# Patient Record
Sex: MALE | Race: AMERICAN INDIAN OR ALASKA NATIVE | NOT HISPANIC OR LATINO | Employment: OTHER | URBAN - METROPOLITAN AREA
[De-identification: names, ages, dates, MRNs, and addresses within clinical notes are randomized per-mention and may not be internally consistent; named-entity substitution may affect disease eponyms.]

---

## 2019-09-18 ENCOUNTER — TELEPHONE (OUTPATIENT)
Dept: GASTROENTEROLOGY | Facility: CLINIC | Age: 74
End: 2019-09-18

## 2019-09-18 NOTE — TELEPHONE ENCOUNTER
Per dr Vianey suggs pt for capsule-pt inpt at [facility] egd done 9/16/19  (report for egd and hpylori were put on dr swartz's desk 9/18/19-will not be scanned in until dr swartz signs off on the hpylori)

## 2019-09-20 ENCOUNTER — TELEPHONE (OUTPATIENT)
Dept: GASTROENTEROLOGY | Facility: CLINIC | Age: 74
End: 2019-09-20

## 2019-09-20 NOTE — TELEPHONE ENCOUNTER
Mai Dumont, nurse from Dr Conley/SHANNON @ [facility] has ques re: resuming meds; states Pt was in pt w/ anemia and questionable GI bld; consult on 9/17 indicates Rony Brower resuming Eliquis but Pt also on 81 mg aspirin and not mentioned; states cardiology ref'd to GI; req  [phone]

## 2019-09-25 ENCOUNTER — TELEPHONE (OUTPATIENT)
Dept: GASTROENTEROLOGY | Facility: CLINIC | Age: 74
End: 2019-09-25

## 2019-09-25 DIAGNOSIS — B96.81 HELICOBACTER PYLORI (H. PYLORI) AS THE CAUSE OF DISEASES CLASSIFIED ELSEWHERE: Primary | ICD-10-CM

## 2019-09-25 NOTE — TELEPHONE ENCOUNTER
MATTHEW--Order for Pylera was to be sent to his pharmacy but was set to print so did not go through  Called and spoke with pt's son Bertha Blanco who noted pt has been on antibiotics for H pylori since D/C 9/18  He is on Pantoprazole, Doxycycline, Amoxicillin and Bismuth  ([facility] discharge on your desk for review)  Did transfer to the  for follow up apt

## 2019-09-25 NOTE — TELEPHONE ENCOUNTER
Attempted to call patient regarding positive biopsy from EGD of September 16th for H pylori  Got busy signal   Please try to reach him  Will Rx treatment  No allergies are listed maybe make sure with him that he is not allergic to any of the antibiotics    Thanks

## 2019-10-03 ENCOUNTER — OFFICE VISIT (OUTPATIENT)
Dept: GASTROENTEROLOGY | Facility: CLINIC | Age: 74
End: 2019-10-03
Payer: COMMERCIAL

## 2019-10-03 ENCOUNTER — TELEPHONE (OUTPATIENT)
Dept: GASTROENTEROLOGY | Facility: CLINIC | Age: 74
End: 2019-10-03

## 2019-10-03 VITALS
DIASTOLIC BLOOD PRESSURE: 80 MMHG | BODY MASS INDEX: 23.25 KG/M2 | SYSTOLIC BLOOD PRESSURE: 140 MMHG | HEIGHT: 69 IN | WEIGHT: 157 LBS | HEART RATE: 72 BPM

## 2019-10-03 DIAGNOSIS — K21.9 GASTROESOPHAGEAL REFLUX DISEASE, ESOPHAGITIS PRESENCE NOT SPECIFIED: ICD-10-CM

## 2019-10-03 DIAGNOSIS — K59.00 CONSTIPATION, UNSPECIFIED CONSTIPATION TYPE: ICD-10-CM

## 2019-10-03 DIAGNOSIS — A04.8 H. PYLORI INFECTION: ICD-10-CM

## 2019-10-03 DIAGNOSIS — D50.9 IRON DEFICIENCY ANEMIA, UNSPECIFIED IRON DEFICIENCY ANEMIA TYPE: Primary | ICD-10-CM

## 2019-10-03 DIAGNOSIS — R19.5 FECAL OCCULT BLOOD TEST POSITIVE: ICD-10-CM

## 2019-10-03 PROBLEM — Z12.11 COLON CANCER SCREENING: Status: ACTIVE | Noted: 2019-10-03

## 2019-10-03 PROCEDURE — 99214 OFFICE O/P EST MOD 30 MIN: CPT | Performed by: NURSE PRACTITIONER

## 2019-10-03 RX ORDER — DOCUSATE SODIUM 100 MG/1
100 CAPSULE, LIQUID FILLED ORAL 2 TIMES DAILY
COMMUNITY

## 2019-10-03 RX ORDER — INSULIN GLARGINE 100 [IU]/ML
30 INJECTION, SOLUTION SUBCUTANEOUS
Refills: 3 | COMMUNITY
Start: 2019-10-02

## 2019-10-03 RX ORDER — AMIODARONE HYDROCHLORIDE 200 MG/1
200 TABLET ORAL DAILY
COMMUNITY
Start: 2019-06-29

## 2019-10-03 RX ORDER — PANTOPRAZOLE SODIUM 40 MG/1
40 TABLET, DELAYED RELEASE ORAL DAILY
Refills: 1 | COMMUNITY
Start: 2019-10-02

## 2019-10-03 RX ORDER — ATORVASTATIN CALCIUM 80 MG/1
80 TABLET, FILM COATED ORAL DAILY
Refills: 1 | COMMUNITY
Start: 2019-10-02

## 2019-10-03 RX ORDER — RANOLAZINE 500 MG/1
500 TABLET, EXTENDED RELEASE ORAL 2 TIMES DAILY
Refills: 1 | COMMUNITY
Start: 2019-10-02

## 2019-10-03 RX ORDER — SPIRONOLACTONE 25 MG/1
25 TABLET ORAL
COMMUNITY

## 2019-10-03 RX ORDER — ASPIRIN 81 MG/1
81 TABLET ORAL DAILY
COMMUNITY
Start: 2019-06-26

## 2019-10-03 RX ORDER — POLYETHYLENE GLYCOL 3350 17 G/17G
17 POWDER, FOR SOLUTION ORAL DAILY
COMMUNITY

## 2019-10-03 RX ORDER — METOPROLOL TARTRATE 50 MG/1
TABLET, FILM COATED ORAL
Refills: 1 | COMMUNITY
Start: 2019-10-02

## 2019-10-03 RX ORDER — ERGOCALCIFEROL 1.25 MG/1
CAPSULE ORAL
COMMUNITY
Start: 2019-05-29

## 2019-10-03 RX ORDER — PNV NO.95/FERROUS FUM/FOLIC AC 28MG-0.8MG
1 TABLET ORAL 2 TIMES DAILY
Refills: 1 | COMMUNITY
Start: 2019-10-02

## 2019-10-03 RX ORDER — ACETAMINOPHEN 325 MG/1
TABLET ORAL
Refills: 0 | COMMUNITY
Start: 2019-08-10

## 2019-10-03 RX ORDER — UREA 15 G
POWDER IN PACKET (EA) ORAL
Refills: 0 | COMMUNITY
Start: 2019-09-18

## 2019-10-03 NOTE — TELEPHONE ENCOUNTER
Spoke with son Amandeep Aman in person after OV today  Reviewed capsule instructions, given information  I told him I would call when I am able to schedule    OK to call him at [phone] and if no answer can call [phone]

## 2019-10-03 NOTE — PATIENT INSTRUCTIONS
Continue high-fiber diet and make sure you are drinking plenty of fluids/water every day  You may start a daily probiotic  You may need to try a couple different ones before you find 1 that works or is helpful  Start a daily fiber supplement with food (1 tbsp Metamucil, Citrucel, Benefiber, 2 fiber tablets or gummies)  Continue MiraLax 1 cap full daily  May adjust dosage (i e ; 1/2 cap full instead of 1 cap full) and/or frequency (i e ; 2 X/day or 3 X/week instead of daily) to prevent straining with bowel movements or diarrhea  Goal is for 3 bowel movements per week  Try using Dulcolax suppository if no bowel movement in more than 3 days    High Fiber Diet   WHAT YOU NEED TO KNOW:   What is a high-fiber diet? A high-fiber diet includes foods that have a high amount of fiber  Fiber is the part of fruits, vegetables, and grains that is not broken down by your body  Fiber keeps your bowel movements regular  Fiber can also help lower your cholesterol level, control blood sugar in people with diabetes, and relieve constipation  Fiber can also help you control your weight because it helps you feel full faster  Most adults should eat 25 to 35 grams of fiber each day  Talk to your dietitian or healthcare provider about the amount of fiber you need  What foods are good sources of fiber?    · Foods with at least 4 grams of fiber per serving:      ¨ ? to ½ cup of high-fiber cereal (check the nutrition label on the box)    ¨ ½ cup of blackberries or raspberries    ¨ 4 dried prunes    ¨ 1 cooked artichoke    ¨ ½ cup of cooked legumes, such as lentils, or red, kidney, and velázquez beans    · Foods with 1 to 3 grams of fiber per serving:      ¨ 1 slice of whole-wheat, pumpernickel, or rye bread    ¨ ½ cup of cooked brown rice    ¨ 4 whole-wheat crackers    ¨ 1 cup of oatmeal    ¨ ½ cup of cereal with 1 to 3 grams of fiber per serving (check the nutrition label on the box)    ¨ 1 small piece of fruit, such as an apple, banana, pear, kiwi, or orange    ¨ 3 dates    ¨ ½ cup of canned apricots, fruit cocktail, peaches, or pears    ¨ ½ cup of raw or cooked vegetables, such as carrots, cauliflower, cabbage, spinach, squash, or corn  What are some ways that I can increase fiber in my diet? · Choose brown or wild rice instead of white rice  · Use whole wheat flour in recipes instead of white or all-purpose flour  · Add beans and peas to casseroles or soups  · Choose fresh fruit and vegetables with peels or skins on instead of juices  What other guidelines should I follow? · Add fiber to your diet slowly  You may have abdominal discomfort, bloating, and gas if you add fiber to your diet too quickly  · Drink plenty of liquids as you add fiber to your diet  You may have nausea or develop constipation if you do not drink enough water  Ask how much liquid to drink each day and which liquids are best for you  CARE AGREEMENT:   You have the right to help plan your care  Discuss treatment options with your caregivers to decide what care you want to receive  You always have the right to refuse treatment  The above information is an  only  It is not intended as medical advice for individual conditions or treatments  Talk to your doctor, nurse or pharmacist before following any medical regimen to see if it is safe and effective for you  © 2017 [employer]  Information is for End User's use only and may not be sold, redistributed or otherwise used for commercial purposes  All illustrations and images included in CareNotes® are the copyrighted property of [employer]  or [employer]  Gastroesophageal Reflux Disease   WHAT YOU NEED TO KNOW:   Gastroesophageal reflux occurs when acid and food in the stomach back up into the esophagus  Gastroesophageal reflux disease (GERD) is reflux that occurs more than twice a week for a few weeks  It usually causes heartburn and other symptoms   GERD can cause other health problems over time if it is not treated  DISCHARGE INSTRUCTIONS:   Return to the emergency department if:   · You feel full and cannot burp or vomit  · You have severe chest pain and sudden trouble breathing  · Your bowel movements are black, bloody, or tarry-looking  · Your vomit looks like coffee grounds or has blood in it  Contact your healthcare provider if:   · You vomit large amounts, or you vomit often  · You have trouble breathing after you vomit  · You have trouble swallowing, or pain with swallowing  · You are losing weight without trying  · Your symptoms get worse or do not improve with treatment  · You have questions or concerns about your condition or care  Medicines:   · Medicines  are used to decrease stomach acid  Medicine may also be used to help your lower esophageal sphincter and stomach contract (tighten) more  · Take your medicine as directed  Contact your healthcare provider if you think your medicine is not helping or if you have side effects  Tell him of her if you are allergic to any medicine  Keep a list of the medicines, vitamins, and herbs you take  Include the amounts, and when and why you take them  Bring the list or the pill bottles to follow-up visits  Carry your medicine list with you in case of an emergency  Manage GERD:   · Do not have foods or drinks that may increase heartburn  These include chocolate, peppermint, fried or fatty foods, drinks that contain caffeine, or carbonated drinks (soda)  Other foods include spicy foods, onions, tomatoes, and tomato-based foods  Do not have foods or drinks that can irritate your esophagus, such as citrus fruits, juices, and alcohol  · Do not eat large meals  When you eat a lot of food at one time, your stomach needs more acid to digest it  Eat 6 small meals each day instead of 3 large ones, and eat slowly  Do not eat meals 2 to 3 hours before bedtime      · Elevate the head of your bed  Place 6-inch blocks under the head of your bed frame  You may also use more than one pillow under your head and shoulders while you sleep  · Maintain a healthy weight  If you are overweight, weight loss may help relieve symptoms of GERD  · Do not smoke  Smoking weakens the lower esophageal sphincter and increases the risk of GERD  Ask your healthcare provider for information if you currently smoke and need help to quit  E-cigarettes or smokeless tobacco still contain nicotine  Talk to your healthcare provider before you use these products  · Do not wear clothing that is tight around your waist   Tight clothing can put pressure on your stomach and cause or worsen GERD symptoms  Follow up with your healthcare provider as directed:  Write down your questions so you remember to ask them during your visits  © 2017 [facility] Information is for End User's use only and may not be sold, redistributed or otherwise used for commercial purposes  All illustrations and images included in CareNotes® are the copyrighted property of [employer]  or [employer] Mat[employer]  The above information is an  only  It is not intended as medical advice for individual conditions or treatments  Talk to your doctor, nurse or pharmacist before following any medical regimen to see if it is safe and effective for you

## 2019-10-03 NOTE — PROGRESS NOTES
[id number] - Outpatient Consultation  Moi Hodge 66 y o  male MRN: [id number]  Encounter: [id number]    ASSESSMENT AND PLAN:      1  Iron deficiency anemia, unspecified iron deficiency anemia type  2  Fecal occult blood test positive  There were no findings for colitis or diverticulitis, ischemic bowel on recent CT scan during [facility] hospitalization in September  Colonoscopy done in the Spring was essentially normal   First EGD showed [place] but repeat EGD 9/16 showed some gastritis and +for H pylori which was treated  No definitive UGI or LGI source for his anemia  Suspicion remains for small bowel AVMs as etiology for the sudden episodes of acute anemia and probably exacerbated by use of Eliquis for his history of atrial fibrillation, in addition to what is most likely anemia of chronic disease from his decreased renal function  He does follow with Nephrology for his renal disease and creatinine is currently at baseline according to the patient's son  Will continue daily oral iron supplement and consider discontinuation if repeat CBC, iron studies are normal   Will order capsule endoscopy with 2 day prep, to assess for small bowel etiology for chronic GI blood loss including AVMs, ectasias  May need referral to Hematology for additional anemia workup if capsule study negative for bleeding source  - CBC  - Fe+TIBC+Brad  - Capsule endoscopy - In office performed; Future    3  Constipation, unspecified constipation type  This may be functional as son reports as a chronic tendency however likely worsened or exacerbated by decreased mobility from persistent anemia and deconditioning,motility which may be functional or related to affects of diabetes and possibly additional medication effect with polypharmacy treating multiple medical comorbidities (diuretics, oral iron supplement, amiodarone)  Recommend high-fiber diet, fluids, continuation of b i d  Colace    Additionally advised to start daily fiber supplement, probiotic and continue MiraLax with adjustment to prevent straining with bowel movements or diarrhea  He will need a 2 day preparation for the capsule study to ensure adequate prep for results  4  Gastroesophageal reflux disease, esophagitis presence not specified  5  H  pylori infection  Current symptoms of morning reflux, cough and throat clearing may be related to mild gastritis seen on most recent EGD in September  The H pylori infection was treated with 14 days of Pylera  We will continue daily pantoprazole 40 mg  Reviewed lifestyle modifications and reflux diet  Will recheck H pylori stool antigen to ensure eradication with previous treatment     - H  pylori antigen, stool      Followup Appointment:  6 weeks  ______________________________________________________________________    Chief Complaint   Patient presents with    Follow-up     [facility] ER       HPI:   Godfrey Little is a 66 y o   male who presents, accompanied by his wife and son, to follow up his recent [facility] hospitalization for syncopal event and anemia  The patient does not have a consistent command of the English language so his son provides medical information and translation regarding symptoms for the patient  Information is also gleaned from the patient's [facility] hospitalization record  He presented to the ER 9/13 with a syncopal event at the time of this event, he may have had some dark stools but has been taking iron for anemia found on hospitalization in the spring  During that hospitalization he did have both upper and lower endoscopy showing only small hiatal hernia  He was found to have renal insufficiency so anemia was somewhat attributed to this and he was getting Epogen until bout 1 month prior to this hospitalization in September  There is some intermittent reporting of melena as well as a heme + stool test in the ER according to the patient's son    Hemoglobin was 7 7 on presentation and his discharge hemoglobin was 9 5 after transfusion with 2 units PRBCs  A repeat EGD was performed and showed only some gastritis but pathology was +for H pylori  He did complete a full course of treatment with Pylera and son reports there have been no further UGI symptoms, heartburn, reflux, chest pain  There has been some possible reflux in the morning while brushing his teeth after which he throws up some bile  In the morning he is also noted with increased coughing and throat clearing at times  He remains on a daily oral iron supplement but continues to have fatigue, anorexia  The son states he has been deconditioned related to these recent illnesses, requiring physical therapy  He continues with issues of constipation despite taking MiraLax and Colace  He is having about 3 bowel movements per week but these do require some straining however there has been no more evidence of rectal bleeding or complaint of abdominal or rectal pain  Son reports the patient has been prone to constipation requiring an enema about once per year  During the recent hospitalization CT scan did show a large amount of stool in the colon with fecal impaction        Historical Information   Past Medical History:   Diagnosis Date    Atrial fibrillation ([facility] )     [facility]estive [facility])     Chronic renal insufficiency     Coronary artery disease     History of cardiac pacemaker     Hyperlipidemia     Hypertension     Hyponatremia     Type 2 diabetes mellitus ([facility] )      Past Surgical History:   Procedure Laterality Date    CARDIAC PACEMAKER PLACEMENT      CORONARY ANGIOPLASTY WITH STENT PLACEMENT       Social History     Substance and Sexual Activity   Alcohol Use Not Currently     Social History     Substance and Sexual Activity   Drug Use Never     Social History     Tobacco Use   Smoking Status Never Smoker   Smokeless Tobacco Never Used     Family History   Problem Relation Age of Onset    No Known Problems Mother     Heart disease Father     No Known Problems Sister     Heart disease Brother        Meds/Allergies     Current Outpatient Medications:     acetaminophen (TYLENOL) 325 mg tablet    amiodarone 200 mg tablet    apixaban (ELIQUIS) 5 mg    aspirin (EQ ASPIRIN ADULT LOW DOSE) 81 mg EC tablet    atorvastatin (LIPITOR) 80 mg tablet    bismuth-metronidazole-tetracycline (PYLERA) 140-125-125 MG per capsule    docusate sodium (COLACE) 100 mg capsule    ergocalciferol (VITAMIN D2) 50,000 units    Ferrous Sulfate (IRON) 325 (65 Fe) MG TABS    LANTUS SOLOSTAR 100 units/mL injection pen    metoprolol tartrate (LOPRESSOR) 50 mg tablet    pantoprazole (PROTONIX) 40 mg tablet    polyethylene glycol (GLYCOLAX) powder    ranolazine (RANEXA) 500 mg 12 hr tablet    spironolactone (ALDACTONE) 25 mg tablet    URE-NA 15 g PACK    No Known Allergies    PHYSICAL EXAM:    Blood pressure 140/80, pulse 72, height 5' 9" (1 753 m), weight 71 2 kg (157 lb)  Body mass index is 23 18 kg/m²  General Appearance: NAD, cooperative, alert  Eyes: Anicteric, PERRLA, EOMI  ENT:  Normocephalic, atraumatic, normal mucosa  Neck:  Supple, symmetrical, trachea midline,   Resp:  Clear to auscultation bilaterally; no rales, rhonchi or wheezing; respirations unlabored   CV:  S1 S2, Regular rate and rhythm; no murmur, rub, or gallop  GI:  Soft, non-tender, non-distended; normal bowel sounds; no masses, no organomegaly   Rectal: Deferred  Musculoskeletal: No cyanosis, clubbing or edema  Normal ROM  Skin:  No jaundice, rashes, or lesions   Heme/Lymph: No palpable cervical lymphadenopathy  Psych: Normal affect, good eye contact  Neuro: No gross deficits, AAOx3    Lab Results:  Reviewed laboratory results from [facility] September, 2019 hospitalization      No results found for: WBC, HGB, HCT, MCV, PLT  No results found for: NA, K, CL, CO2, ANIONGAP, BUN, CREATININE, GLUCOSE, GLUF, CALCIUM, CORRECTEDCA, AST, ALT, ALKPHOS, PROT, BILITOT, EGFR  No results found for: IRON, TIBC, FERRITIN  No results found for: LIPASE    Radiology Results:   Reviewed results of MRI from June, 2019 [address]  [facility] hospitalization and CT scan from September, 2019 [address]  [facility] hospitalization    REVIEW OF SYSTEMS:    CONSTITUTIONAL: Denies any fever, chills, rigors, and weight loss  Reports fatigue  HEENT: No earache or tinnitus  Denies hearing loss or visual disturbances  CARDIOVASCULAR: No chest pain at rest or palpitations  He does report chest pain with exertion, irregular heartbeat  RESPIRATORY: Denies  hemoptysis, but reports cough, shortness of breath, dyspnea on exertion  GASTROINTESTINAL: As noted in the History of Present Illness  Reports decreased appetite, blood in stool, heartburn  GENITOURINARY: No problems with urination  Denies any hematuria or dysuria  NEUROLOGIC: No dizziness or vertigo, denies headaches  QA reports loss of strength  MUSCULOSKELETAL: Denies any muscle or joint pain  SKIN: Denies skin rashes or itching  Reports edema  ENDOCRINE: Denies excessive thirst  Denies intolerance to heat or cold  PSYCHOSOCIAL: Denies depression or anxiety  Denies any recent memory loss  Reports loss of appetite

## 2019-11-06 NOTE — TELEPHONE ENCOUNTER
On 10/14/19  on  for son to call back to schedule capsule  Called again today 11/6/19 and VM on this # was full  Left message at [phone] to call to schedule capsule